# Patient Record
Sex: MALE | Race: WHITE
[De-identification: names, ages, dates, MRNs, and addresses within clinical notes are randomized per-mention and may not be internally consistent; named-entity substitution may affect disease eponyms.]

---

## 2018-06-12 ENCOUNTER — HOSPITAL ENCOUNTER (EMERGENCY)
Dept: HOSPITAL 46 - ED | Age: 18
Discharge: HOME | End: 2018-06-12
Payer: COMMERCIAL

## 2018-06-12 VITALS — WEIGHT: 150 LBS | BODY MASS INDEX: 21.47 KG/M2 | HEIGHT: 70 IN

## 2018-06-12 DIAGNOSIS — F10.10: ICD-10-CM
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
## 2018-06-12 DIAGNOSIS — Z04.1: Primary | ICD-10-CM

## 2018-06-12 PROCEDURE — G0480 DRUG TEST DEF 1-7 CLASSES: HCPCS

## 2019-06-05 ENCOUNTER — HOSPITAL ENCOUNTER (EMERGENCY)
Dept: HOSPITAL 46 - ED | Age: 19
Discharge: HOME | End: 2019-06-05
Payer: COMMERCIAL

## 2019-06-05 VITALS — WEIGHT: 164.99 LBS | HEIGHT: 70 IN | BODY MASS INDEX: 23.62 KG/M2
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
## 2019-06-05 DIAGNOSIS — F17.200: ICD-10-CM

## 2019-06-05 DIAGNOSIS — R56.9: ICD-10-CM

## 2019-06-05 DIAGNOSIS — F10.239: Primary | ICD-10-CM

## 2019-06-05 PROCEDURE — G0480 DRUG TEST DEF 1-7 CLASSES: HCPCS
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
## 2019-06-08 ENCOUNTER — HOSPITAL ENCOUNTER (EMERGENCY)
Dept: HOSPITAL 46 - ED | Age: 19
Discharge: HOME | End: 2019-06-08
Payer: COMMERCIAL

## 2019-06-08 VITALS — HEIGHT: 70 IN | WEIGHT: 150.99 LBS | BODY MASS INDEX: 21.62 KG/M2

## 2019-06-08 DIAGNOSIS — F17.200: ICD-10-CM

## 2019-06-08 DIAGNOSIS — G40.909: Primary | ICD-10-CM

## 2019-06-08 PROCEDURE — G0480 DRUG TEST DEF 1-7 CLASSES: HCPCS
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
## 2019-07-03 ENCOUNTER — HOSPITAL ENCOUNTER (EMERGENCY)
Dept: HOSPITAL 46 - ED | Age: 19
LOS: 1 days | Discharge: HOME | End: 2019-07-04
Payer: COMMERCIAL

## 2019-07-03 VITALS — BODY MASS INDEX: 21.47 KG/M2 | WEIGHT: 150 LBS | HEIGHT: 70 IN

## 2019-07-03 DIAGNOSIS — F17.200: ICD-10-CM

## 2019-07-03 DIAGNOSIS — Z79.899: ICD-10-CM

## 2019-07-03 DIAGNOSIS — R45.851: Primary | ICD-10-CM

## 2019-07-03 NOTE — XMS
PreManage Notification: DOLORES MALONEY MRN:[id number]
 
Security Information
 
Security Events
No recent Security Events currently on file
 
 
 
CRITERIA MET
------------
- [facility] - Has Care Guidelines
- PDMP
- [facility] - 2 Visits in 30 Days
 
 
CARE PROVIDERS
-------------------------------------------------------------------------------------
GILLIAN SAGASTUME     Internal Medicine     06/10/2019-Current
 
PHONE: Unknown
-------------------------------------------------------------------------------------
RITA PEREZ     Pediatrics     06/06/2019-Current
 
PHONE: Unknown
-------------------------------------------------------------------------------------
GILLIAN SAGASTUME     Primary Care     Current
 
PHONE: Unknown
-------------------------------------------------------------------------------------
 
Andree has no Care Guidelines for this patient.
Care History
Medical/Surgical
06/06/2019    [facility]
 
      - PATIENT TO ESTABLISH CARE WITH DR SAGASTUME ON 07/09/19.
E.D. VISIT COUNT (12 MO.)
-------------------------------------------------------------------------------------
1 [facility]
 
3 [facility]
-------------------------------------------------------------------------------------
TOTAL 4
-------------------------------------------------------------------------------------
NOTE: Visits indicate total known visits.
 
ED/UCC VISIT TRACKING (12 MO.)
-------------------------------------------------------------------------------------
07/03/2019 23:09
[facility] [place]
 
TYPE: Emergency
 
COMPLAINT:
- MED CLEARANCE
-------------------------------------------------------------------------------------
06/08/2019 13:49
[facility]
 
 
[facility]
 
COMPLAINT:
- SEIZURE
 
DIAGNOSES:
- Unspecified convulsions
- Epilepsy, unspecified, not intractable, without status epilepticus
- Nicotine dependence, unspecified, uncomplicated
-------------------------------------------------------------------------------------
06/05/2019 09:35
[facility] [place]
 
TYPE: Emergency
 
COMPLAINT:
- SEIZURE
 
DIAGNOSES:
- Blood alcohol level of less than 20 mg/100 ml
- Nicotine dependence, unspecified, uncomplicated
- Alcohol dependence with withdrawal, unspecified
- Unspecified convulsions
-------------------------------------------------------------------------------------
05/23/2019 00:01
[facility] [place]
 
TYPE: Emergency
 
DIAGNOSES:
- Alcohol use, unspecified with intoxication, uncomplicated
- INTOXICATED
-------------------------------------------------------------------------------------
 
 
INPATIENT VISIT TRACKING (12 MO.)
No inpatient visits to display in this time frame
 
[email or web address]

## 2020-10-21 ENCOUNTER — HOSPITAL ENCOUNTER (EMERGENCY)
Dept: HOSPITAL 46 - ED | Age: 20
Discharge: HOME | End: 2020-10-21
Payer: COMMERCIAL

## 2020-10-21 VITALS — HEIGHT: 71 IN | BODY MASS INDEX: 23.1 KG/M2 | WEIGHT: 165 LBS
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
## 2020-10-21 DIAGNOSIS — F17.200: ICD-10-CM

## 2020-10-21 DIAGNOSIS — F90.9: ICD-10-CM

## 2020-10-21 DIAGNOSIS — Z79.899: ICD-10-CM

## 2020-10-21 DIAGNOSIS — S01.01XA: Primary | ICD-10-CM
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
## 2021-05-09 ENCOUNTER — HOSPITAL ENCOUNTER (EMERGENCY)
Dept: HOSPITAL 46 - ED | Age: 21
Discharge: HOME | End: 2021-05-09
Payer: COMMERCIAL

## 2021-05-09 VITALS — BODY MASS INDEX: 23.34 KG/M2 | WEIGHT: 163.01 LBS | HEIGHT: 70 IN

## 2021-05-09 DIAGNOSIS — F10.10: ICD-10-CM

## 2021-05-09 DIAGNOSIS — F11.10: ICD-10-CM

## 2021-05-09 DIAGNOSIS — F17.200: ICD-10-CM

## 2021-05-09 DIAGNOSIS — F14.10: Primary | ICD-10-CM

## 2021-05-09 DIAGNOSIS — Z79.899: ICD-10-CM

## 2021-05-09 DIAGNOSIS — F15.10: ICD-10-CM

## 2021-05-11 ENCOUNTER — HOSPITAL ENCOUNTER (EMERGENCY)
Dept: HOSPITAL 46 - ED | Age: 21
Discharge: HOME | End: 2021-05-11
Payer: COMMERCIAL

## 2021-05-11 VITALS — BODY MASS INDEX: 21.97 KG/M2 | HEIGHT: 67 IN | WEIGHT: 139.99 LBS

## 2021-05-11 DIAGNOSIS — F15.129: Primary | ICD-10-CM
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
## 2021-10-18 ENCOUNTER — HOSPITAL ENCOUNTER (EMERGENCY)
Dept: HOSPITAL 46 - ED | Age: 21
Discharge: HOME | End: 2021-10-18
Payer: COMMERCIAL

## 2021-10-18 VITALS — BODY MASS INDEX: 21.97 KG/M2 | WEIGHT: 140 LBS | HEIGHT: 67 IN

## 2021-10-18 DIAGNOSIS — F90.9: ICD-10-CM

## 2021-10-18 DIAGNOSIS — U07.1: Primary | ICD-10-CM
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
## 2021-10-18 DIAGNOSIS — K92.0: ICD-10-CM

## 2021-10-18 DIAGNOSIS — Z79.899: ICD-10-CM

## 2021-10-18 PROCEDURE — C9113 INJ PANTOPRAZOLE SODIUM, VIA: HCPCS

## 2021-10-18 PROCEDURE — G0480 DRUG TEST DEF 1-7 CLASSES: HCPCS

## 2021-10-18 PROCEDURE — U0003 INFECTIOUS AGENT DETECTION BY NUCLEIC ACID (DNA OR RNA); SEVERE ACUTE RESPIRATORY SYNDROME CORONAVIRUS 2 (SARS-COV-2) (CORONAVIRUS DISEASE [COVID-19]), AMPLIFIED PROBE TECHNIQUE, MAKING USE OF HIGH THROUGHPUT TECHNOLOGIES AS DESCRIBED BY CMS-2020-01-R: HCPCS

## 2021-10-18 PROCEDURE — C9803 HOPD COVID-19 SPEC COLLECT: HCPCS
